# Patient Record
Sex: MALE | Race: WHITE | NOT HISPANIC OR LATINO | Employment: FULL TIME | URBAN - METROPOLITAN AREA
[De-identification: names, ages, dates, MRNs, and addresses within clinical notes are randomized per-mention and may not be internally consistent; named-entity substitution may affect disease eponyms.]

---

## 2018-08-08 ENCOUNTER — ANESTHESIA EVENT (OUTPATIENT)
Dept: PERIOP | Facility: HOSPITAL | Age: 23
End: 2018-08-08

## 2018-08-08 RX ORDER — FAMOTIDINE 10 MG/ML
20 INJECTION, SOLUTION INTRAVENOUS ONCE
Status: CANCELLED | OUTPATIENT
Start: 2018-08-08 | End: 2018-08-08

## 2018-08-09 ENCOUNTER — HOSPITAL ENCOUNTER (OUTPATIENT)
Facility: HOSPITAL | Age: 23
Setting detail: HOSPITAL OUTPATIENT SURGERY
Discharge: HOME | End: 2018-08-09
Attending: OTOLARYNGOLOGY | Admitting: ANESTHESIOLOGY

## 2018-08-09 ENCOUNTER — ANESTHESIA (OUTPATIENT)
Dept: PERIOP | Facility: HOSPITAL | Age: 23
End: 2018-08-09

## 2018-08-09 VITALS
TEMPERATURE: 98.2 F | DIASTOLIC BLOOD PRESSURE: 68 MMHG | HEART RATE: 61 BPM | BODY MASS INDEX: 41.75 KG/M2 | RESPIRATION RATE: 19 BRPM | OXYGEN SATURATION: 92 % | HEIGHT: 73 IN | SYSTOLIC BLOOD PRESSURE: 121 MMHG | WEIGHT: 315 LBS

## 2018-08-09 DIAGNOSIS — J03.90 TONSILLITIS: ICD-10-CM

## 2018-08-09 PROBLEM — J35.01 CHRONIC TONSILLITIS: Status: ACTIVE | Noted: 2018-08-09

## 2018-08-09 LAB
HCT VFR BLD AUTO: 43.6 % (ref 38.9–50.9)
HGB BLD-MCNC: 14.5 G/DL (ref 13.1–17.5)

## 2018-08-09 PROCEDURE — 85014 HEMATOCRIT: CPT | Performed by: ANESTHESIOLOGY

## 2018-08-09 PROCEDURE — 25010000002 PROPOFOL 10 MG/ML EMULSION: Performed by: ANESTHESIOLOGY

## 2018-08-09 PROCEDURE — 25010000002 MIDAZOLAM PER 1 MG: Performed by: ANESTHESIOLOGY

## 2018-08-09 PROCEDURE — 25010000002 FENTANYL CITRATE (PF) 100 MCG/2ML SOLUTION: Performed by: ANESTHESIOLOGY

## 2018-08-09 PROCEDURE — 25010000002 ONDANSETRON PER 1 MG: Performed by: ANESTHESIOLOGY

## 2018-08-09 PROCEDURE — 88304 TISSUE EXAM BY PATHOLOGIST: CPT | Performed by: OTOLARYNGOLOGY

## 2018-08-09 PROCEDURE — 85018 HEMOGLOBIN: CPT | Performed by: ANESTHESIOLOGY

## 2018-08-09 PROCEDURE — 25010000002 NEOSTIGMINE PER 0.5 MG: Performed by: ANESTHESIOLOGY

## 2018-08-09 PROCEDURE — 25010000002 SUCCINYLCHOLINE PER 20 MG: Performed by: ANESTHESIOLOGY

## 2018-08-09 PROCEDURE — 25010000002 HYDROMORPHONE PER 4 MG: Performed by: ANESTHESIOLOGY

## 2018-08-09 PROCEDURE — 25010000002 PROMETHAZINE PER 50 MG: Performed by: ANESTHESIOLOGY

## 2018-08-09 PROCEDURE — 25010000002 DEXAMETHASONE PER 1 MG: Performed by: ANESTHESIOLOGY

## 2018-08-09 RX ORDER — FENTANYL CITRATE 50 UG/ML
50 INJECTION, SOLUTION INTRAMUSCULAR; INTRAVENOUS
Status: DISCONTINUED | OUTPATIENT
Start: 2018-08-09 | End: 2018-08-09 | Stop reason: HOSPADM

## 2018-08-09 RX ORDER — FAMOTIDINE 20 MG/1
20 TABLET, FILM COATED ORAL ONCE
Status: COMPLETED | OUTPATIENT
Start: 2018-08-09 | End: 2018-08-09

## 2018-08-09 RX ORDER — PROMETHAZINE HYDROCHLORIDE 25 MG/1
25 TABLET ORAL ONCE AS NEEDED
Status: COMPLETED | OUTPATIENT
Start: 2018-08-09 | End: 2018-08-09

## 2018-08-09 RX ORDER — LIDOCAINE HYDROCHLORIDE 10 MG/ML
INJECTION, SOLUTION INFILTRATION; PERINEURAL AS NEEDED
Status: DISCONTINUED | OUTPATIENT
Start: 2018-08-09 | End: 2018-08-09 | Stop reason: SURG

## 2018-08-09 RX ORDER — OXYCODONE HYDROCHLORIDE AND ACETAMINOPHEN 5; 325 MG/1; MG/1
1 TABLET ORAL ONCE AS NEEDED
Status: DISCONTINUED | OUTPATIENT
Start: 2018-08-09 | End: 2018-08-09 | Stop reason: HOSPADM

## 2018-08-09 RX ORDER — OXYCODONE HYDROCHLORIDE AND ACETAMINOPHEN 5; 325 MG/1; MG/1
1-2 TABLET ORAL EVERY 4 HOURS PRN
Qty: 40 TABLET | Refills: 0 | Status: SHIPPED | OUTPATIENT
Start: 2018-08-09

## 2018-08-09 RX ORDER — MAGNESIUM HYDROXIDE 1200 MG/15ML
LIQUID ORAL AS NEEDED
Status: DISCONTINUED | OUTPATIENT
Start: 2018-08-09 | End: 2018-08-09 | Stop reason: HOSPADM

## 2018-08-09 RX ORDER — BUSPIRONE HYDROCHLORIDE 15 MG/1
30 TABLET ORAL 2 TIMES DAILY
COMMUNITY

## 2018-08-09 RX ORDER — PROMETHAZINE HYDROCHLORIDE 25 MG/ML
6.25 INJECTION, SOLUTION INTRAMUSCULAR; INTRAVENOUS ONCE AS NEEDED
Status: COMPLETED | OUTPATIENT
Start: 2018-08-09 | End: 2018-08-09

## 2018-08-09 RX ORDER — ATRACURIUM BESYLATE 10 MG/ML
INJECTION, SOLUTION INTRAVENOUS AS NEEDED
Status: DISCONTINUED | OUTPATIENT
Start: 2018-08-09 | End: 2018-08-09 | Stop reason: SURG

## 2018-08-09 RX ORDER — HYDROMORPHONE HYDROCHLORIDE 1 MG/ML
0.5 INJECTION, SOLUTION INTRAMUSCULAR; INTRAVENOUS; SUBCUTANEOUS
Status: DISCONTINUED | OUTPATIENT
Start: 2018-08-09 | End: 2018-08-09 | Stop reason: HOSPADM

## 2018-08-09 RX ORDER — PROPOFOL 10 MG/ML
VIAL (ML) INTRAVENOUS AS NEEDED
Status: DISCONTINUED | OUTPATIENT
Start: 2018-08-09 | End: 2018-08-09 | Stop reason: SURG

## 2018-08-09 RX ORDER — LIDOCAINE HYDROCHLORIDE 10 MG/ML
0.5 INJECTION, SOLUTION EPIDURAL; INFILTRATION; INTRACAUDAL; PERINEURAL ONCE AS NEEDED
Status: COMPLETED | OUTPATIENT
Start: 2018-08-09 | End: 2018-08-09

## 2018-08-09 RX ORDER — ONDANSETRON 2 MG/ML
4 INJECTION INTRAMUSCULAR; INTRAVENOUS ONCE AS NEEDED
Status: DISCONTINUED | OUTPATIENT
Start: 2018-08-09 | End: 2018-08-09 | Stop reason: HOSPADM

## 2018-08-09 RX ORDER — SODIUM CHLORIDE 0.9 % (FLUSH) 0.9 %
1-10 SYRINGE (ML) INJECTION AS NEEDED
Status: DISCONTINUED | OUTPATIENT
Start: 2018-08-09 | End: 2018-08-09 | Stop reason: HOSPADM

## 2018-08-09 RX ORDER — ONDANSETRON 2 MG/ML
INJECTION INTRAMUSCULAR; INTRAVENOUS AS NEEDED
Status: DISCONTINUED | OUTPATIENT
Start: 2018-08-09 | End: 2018-08-09 | Stop reason: SURG

## 2018-08-09 RX ORDER — SODIUM CHLORIDE, SODIUM LACTATE, POTASSIUM CHLORIDE, CALCIUM CHLORIDE 600; 310; 30; 20 MG/100ML; MG/100ML; MG/100ML; MG/100ML
9 INJECTION, SOLUTION INTRAVENOUS CONTINUOUS
Status: DISCONTINUED | OUTPATIENT
Start: 2018-08-09 | End: 2018-08-09 | Stop reason: HOSPADM

## 2018-08-09 RX ORDER — FENTANYL CITRATE 50 UG/ML
INJECTION, SOLUTION INTRAMUSCULAR; INTRAVENOUS AS NEEDED
Status: DISCONTINUED | OUTPATIENT
Start: 2018-08-09 | End: 2018-08-09 | Stop reason: SURG

## 2018-08-09 RX ORDER — ESCITALOPRAM OXALATE 10 MG/1
10 TABLET ORAL
COMMUNITY

## 2018-08-09 RX ORDER — MIDAZOLAM HYDROCHLORIDE 1 MG/ML
INJECTION INTRAMUSCULAR; INTRAVENOUS AS NEEDED
Status: DISCONTINUED | OUTPATIENT
Start: 2018-08-09 | End: 2018-08-09 | Stop reason: SURG

## 2018-08-09 RX ORDER — DEXAMETHASONE SODIUM PHOSPHATE 4 MG/ML
INJECTION, SOLUTION INTRA-ARTICULAR; INTRALESIONAL; INTRAMUSCULAR; INTRAVENOUS; SOFT TISSUE AS NEEDED
Status: DISCONTINUED | OUTPATIENT
Start: 2018-08-09 | End: 2018-08-09 | Stop reason: SURG

## 2018-08-09 RX ORDER — GLYCOPYRROLATE 0.2 MG/ML
INJECTION INTRAMUSCULAR; INTRAVENOUS AS NEEDED
Status: DISCONTINUED | OUTPATIENT
Start: 2018-08-09 | End: 2018-08-09 | Stop reason: SURG

## 2018-08-09 RX ORDER — PROMETHAZINE HYDROCHLORIDE 25 MG/1
25 SUPPOSITORY RECTAL ONCE AS NEEDED
Status: COMPLETED | OUTPATIENT
Start: 2018-08-09 | End: 2018-08-09

## 2018-08-09 RX ORDER — SUCCINYLCHOLINE CHLORIDE 20 MG/ML
INJECTION INTRAMUSCULAR; INTRAVENOUS AS NEEDED
Status: DISCONTINUED | OUTPATIENT
Start: 2018-08-09 | End: 2018-08-09 | Stop reason: SURG

## 2018-08-09 RX ORDER — CHOLECALCIFEROL (VITAMIN D3) 125 MCG
5 CAPSULE ORAL NIGHTLY
COMMUNITY

## 2018-08-09 RX ADMIN — GLYCOPYRROLATE 0.4 MG: 0.2 INJECTION, SOLUTION INTRAMUSCULAR; INTRAVENOUS at 13:53

## 2018-08-09 RX ADMIN — SODIUM CHLORIDE, POTASSIUM CHLORIDE, SODIUM LACTATE AND CALCIUM CHLORIDE 9 ML/HR: 600; 310; 30; 20 INJECTION, SOLUTION INTRAVENOUS at 13:13

## 2018-08-09 RX ADMIN — PROMETHAZINE HYDROCHLORIDE 6.25 MG: 25 INJECTION INTRAMUSCULAR; INTRAVENOUS at 14:23

## 2018-08-09 RX ADMIN — LIDOCAINE HYDROCHLORIDE 0.3 ML: 10 INJECTION, SOLUTION EPIDURAL; INFILTRATION; INTRACAUDAL; PERINEURAL at 13:14

## 2018-08-09 RX ADMIN — FENTANYL CITRATE 50 MCG: 50 INJECTION INTRAMUSCULAR; INTRAVENOUS at 14:25

## 2018-08-09 RX ADMIN — LIDOCAINE HYDROCHLORIDE 100 MG: 10 INJECTION, SOLUTION INFILTRATION; PERINEURAL at 13:28

## 2018-08-09 RX ADMIN — SODIUM CHLORIDE, POTASSIUM CHLORIDE, SODIUM LACTATE AND CALCIUM CHLORIDE: 600; 310; 30; 20 INJECTION, SOLUTION INTRAVENOUS at 13:15

## 2018-08-09 RX ADMIN — HYDROMORPHONE HYDROCHLORIDE 0.5 MG: 1 INJECTION, SOLUTION INTRAMUSCULAR; INTRAVENOUS; SUBCUTANEOUS at 14:45

## 2018-08-09 RX ADMIN — ATRACURIUM BESYLATE 10 MG: 10 INJECTION, SOLUTION INTRAVENOUS at 13:28

## 2018-08-09 RX ADMIN — FENTANYL CITRATE 50 MCG: 50 INJECTION, SOLUTION INTRAMUSCULAR; INTRAVENOUS at 13:20

## 2018-08-09 RX ADMIN — PROPOFOL 350 MG: 10 INJECTION, EMULSION INTRAVENOUS at 13:28

## 2018-08-09 RX ADMIN — DEXAMETHASONE SODIUM PHOSPHATE 8 MG: 4 INJECTION, SOLUTION INTRAMUSCULAR; INTRAVENOUS at 13:35

## 2018-08-09 RX ADMIN — FENTANYL CITRATE 50 MCG: 50 INJECTION INTRAMUSCULAR; INTRAVENOUS at 14:20

## 2018-08-09 RX ADMIN — FAMOTIDINE 20 MG: 20 TABLET ORAL at 13:13

## 2018-08-09 RX ADMIN — Medication 3 MG: at 13:53

## 2018-08-09 RX ADMIN — ATRACURIUM BESYLATE 20 MG: 10 INJECTION, SOLUTION INTRAVENOUS at 13:39

## 2018-08-09 RX ADMIN — ONDANSETRON 4 MG: 2 INJECTION INTRAMUSCULAR; INTRAVENOUS at 13:53

## 2018-08-09 RX ADMIN — OXYCODONE HYDROCHLORIDE AND ACETAMINOPHEN 1 TABLET: 5; 325 TABLET ORAL at 15:33

## 2018-08-09 RX ADMIN — MIDAZOLAM HYDROCHLORIDE 2 MG: 1 INJECTION, SOLUTION INTRAMUSCULAR; INTRAVENOUS at 13:20

## 2018-08-09 RX ADMIN — SUCCINYLCHOLINE CHLORIDE 163.8 MG: 20 INJECTION, SOLUTION INTRAMUSCULAR; INTRAVENOUS at 13:28

## 2018-08-09 NOTE — OP NOTE
TONSILLECTOMY  Procedure Report    Patient Name:  Jordan Castillo  YOB: 1995    Date of Surgery:  8/9/2018     Indications:  23 year old white male with large tonsils and chronic inflammation including tonsil stones. He elected to proceed with tonsillectomy to improve his symptoms.    Pre-op Diagnosis:   tonsil hypertrophy       Post-Op Diagnosis Codes:     * Chronic tonsillitis [J35.01]     * Tonsillar hypertrophy [J35.1]    Procedure/CPT® Codes:      Procedure(s):  TONSILLECTOMY    Staff:  Surgeon(s):  Dallin Castellanos MD         Anesthesia: General    Estimated Blood Loss: 30ml    Implants:    Nothing was implanted during the procedure    Specimen:          A: bilateral tonsils      Findings: 3+ tonsils    Complications: none immediate    Description of Procedure: Patient was taken to the OR and placed on the table in supine position. Shoulder roll was placed and table was rotated 90 degrees. After endotracheal intubation the johnathan steffany retractor was placed on suspension after draping appropriately. Palpation of the palate revealed no cleft. Each tonsil was dissected from each fossa with electrocautery. Tolerated the procedure well. Throat pack that was placed previously was removed. Awaken from anesthesia and taken to the PACU in stable condition. Will d/c home on percocet.      Dallin Castellanos MD     Date: 8/9/2018  Time: 1:26 PM

## 2018-08-09 NOTE — H&P
"[facility] Pre-op    Full history and physical note from office is up to date.  See office note on chart 7/18/18.    /86 (BP Location: Right arm, Patient Position: Lying)   Pulse 84   Temp 98.3 °F (36.8 °C) (Temporal Artery )   Resp 20   Ht 185.4 cm (73\")   Wt (!) 164 kg (361 lb)   SpO2 96%   BMI 47.63 kg/m²     Cancer Staging (if applicable)  Cancer Patient: __ yes _x_no __unknown__N/A; If yes, clinical stage T:__ N:__M:__, stage group or __N/A    Kalani Singh, APRN 8/9/2018 1:10 PM    "

## 2018-08-09 NOTE — BRIEF OP NOTE
TONSILLECTOMY  Progress Note    Jordan Castillo  8/9/2018    Pre-op Diagnosis:   tonsil hypertrophy       Post-Op Diagnosis Codes:     * Chronic tonsillitis [J35.01]     * Tonsillar hypertrophy [J35.1]    Procedure/CPT® Codes:      Procedure(s):  TONSILLECTOMY    Surgeon(s):  Dallin Castellanos MD    Anesthesia: General    Staff:   * No surgical staff found *    Estimated Blood Loss: 30ml    Urine Voided: * No values recorded between 8/9/2018  1:23 PM and 8/9/2018  1:25 PM *    Specimens:                A: bilateral tonsils      Drains:      Findings: 3+ tonsils    Complications: none immediate      Dallin Castellanos MD     Date: 8/9/2018  Time: 1:25 PM

## 2018-08-09 NOTE — ANESTHESIA PREPROCEDURE EVALUATION
Anesthesia Evaluation     NPO Solid Status: > 8 hours  NPO Liquid Status: > 8 hours           Airway   Mallampati: II  TM distance: >3 FB  Possible difficult intubation  Dental - normal exam     Pulmonary    (+) decreased breath sounds,   (-) asthma, not a smoker  Cardiovascular     Rhythm: regular  Rate: normal    (-) hypertension, angina, murmur      Neuro/Psych  (-) seizures, TIA, CVA  GI/Hepatic/Renal/Endo    (-) liver disease, no renal disease, diabetes    Musculoskeletal     Abdominal    Substance History      OB/GYN          Other            Phys Exam Other: Obesity                Anesthesia Plan    ASA 3     general   (GA  Glidescope in the OR)  intravenous induction     Plan discussed with CRNA.

## 2018-08-09 NOTE — ANESTHESIA POSTPROCEDURE EVALUATION
Patient: Jordan Castillo    Procedure Summary     Date:  08/09/18 Room / Location:   [facility] / [facility]    Anesthesia Start:  1323 Anesthesia Stop:      Procedure:  TONSILLECTOMY (N/A Throat) Diagnosis:       Chronic tonsillitis      Tonsillar hypertrophy      (tonsil hypertrophy)    Surgeon:  Dallin Castellanos MD Provider:  Roopa Hogue MD    Anesthesia Type:  general ASA Status:  3          Anesthesia Type: general  Last vitals  BP   143/86 (08/09/18 1304)   Temp   98.3 °F (36.8 °C) (08/09/18 1304)   Pulse   84 (08/09/18 1304)   Resp   20 (08/09/18 1304)     SpO2   96 % (08/09/18 1304)     Post Anesthesia Care and Evaluation    Patient location during evaluation: PACU  Patient participation: complete - patient participated  Level of consciousness: sleepy but conscious  Pain score: 0  Pain management: adequate  Airway patency: patent  Anesthetic complications: No anesthetic complications  PONV Status: none  Cardiovascular status: hemodynamically stable and acceptable  Respiratory status: nonlabored ventilation, acceptable and nasal cannula  Hydration status: acceptable    Comments: Sitting straight up 134/77 66 20 97.1F 96%

## 2018-08-10 LAB
CYTO UR: NORMAL
LAB AP CASE REPORT: NORMAL
LAB AP CLINICAL INFORMATION: NORMAL
PATH REPORT.FINAL DX SPEC: NORMAL
PATH REPORT.GROSS SPEC: NORMAL

## 2022-11-24 NOTE — ANESTHESIA PROCEDURE NOTES
Airway  Urgency: elective    Date/Time: 8/9/2018 1:28 PM  End Time:8/9/2018 1:30 PM  Airway not difficult    General Information and Staff    Patient location during procedure: OR  Anesthesiologist: KARRIE CARTAGENA    Indications and Patient Condition  Indications for airway management: airway protection    Preoxygenated: yes  MILS not maintained throughout  Mask difficulty assessment: 1 - vent by mask    Final Airway Details  Final airway type: endotracheal airway      Successful airway: ETT  Cuffed: yes   Successful intubation technique: direct laryngoscopy  Facilitating devices/methods: cricoid pressure  Endotracheal tube insertion site: oral  Blade: Dillon  Blade size: #4  ETT size: 7.5 mm  Cormack-Lehane Classification: grade I - full view of glottis  Placement verified by: chest auscultation and capnometry   Inital cuff pressure (cm H2O): 29  Cuff volume (mL): 8  Measured from: lips  ETT to lips (cm): 22  Number of attempts at approach: 1    Additional Comments  Negative epigastric sounds, Breath sound equal bilaterally with symmetric chest rise and fall             Hemorrhage of rectum and anus

## 2024-02-02 ENCOUNTER — OFFICE VISIT (OUTPATIENT)
Dept: FAMILY MEDICINE CLINIC | Facility: CLINIC | Age: 29
End: 2024-02-02
Payer: COMMERCIAL

## 2024-02-02 ENCOUNTER — TELEPHONE (OUTPATIENT)
Dept: CARDIOLOGY | Facility: CLINIC | Age: 29
End: 2024-02-02
Payer: COMMERCIAL

## 2024-02-02 VITALS
OXYGEN SATURATION: 95 % | SYSTOLIC BLOOD PRESSURE: 130 MMHG | DIASTOLIC BLOOD PRESSURE: 90 MMHG | BODY MASS INDEX: 45.1 KG/M2 | HEIGHT: 70 IN | HEART RATE: 89 BPM | WEIGHT: 315 LBS

## 2024-02-02 DIAGNOSIS — I10 PRIMARY HYPERTENSION: ICD-10-CM

## 2024-02-02 DIAGNOSIS — G47.33 OBSTRUCTIVE SLEEP APNEA SYNDROME: ICD-10-CM

## 2024-02-02 DIAGNOSIS — Z13.29 SCREENING FOR THYROID DISORDER: ICD-10-CM

## 2024-02-02 DIAGNOSIS — Z13.220 LIPID SCREENING: ICD-10-CM

## 2024-02-02 DIAGNOSIS — Z11.59 NEED FOR HEPATITIS C SCREENING TEST: ICD-10-CM

## 2024-02-02 DIAGNOSIS — R07.9 CHEST PAIN, UNSPECIFIED TYPE: Primary | ICD-10-CM

## 2024-02-02 DIAGNOSIS — Z13.1 SCREENING FOR DIABETES MELLITUS: ICD-10-CM

## 2024-02-02 PROCEDURE — 36415 COLL VENOUS BLD VENIPUNCTURE: CPT | Performed by: PHYSICIAN ASSISTANT

## 2024-02-02 PROCEDURE — 99204 OFFICE O/P NEW MOD 45 MIN: CPT | Performed by: PHYSICIAN ASSISTANT

## 2024-02-02 NOTE — PROGRESS NOTES
New Patient Office Visit      Date: 2024   Patient Name: Jordan Castillo  : 1995   MRN: [id number]     Chief Complaint:    Chief Complaint   Patient presents with    Establish Care    Chest Pain       History of Present Illness: Jordan Castillo is a 29 y.o. male who is here today to establish care.  He states that he has had some CP off and on in recent months. He had it once a few weeks ago, and his blood pressure was 172/98. He has not had any pain with exertion. He felt SOA, but he was not tight in his chest. He has reduced his caffeine and nicotine intake. He was drinking a half pot of coffee in the morning, and a soda or Red Bull in the afternoon. He has also reduced the frequency of his vaping from chain vaping to heavy vaping.     Subjective      Review of Systems:   Review of Systems   Respiratory:  Negative for shortness of breath.    Cardiovascular:  Positive for chest pain and palpitations. Negative for leg swelling.       Past Medical History:   Past Medical History:   Diagnosis Date    Anxiety and depression     Obesity     Seasonal allergies     Tonsillitis, chronic        Past Surgical History:   Past Surgical History:   Procedure Laterality Date    TONSILLECTOMY N/A 2018    Procedure: TONSILLECTOMY;  Surgeon: Dallin Castellanos MD;  Location: [facility];  Service: ENT    WISDOM TOOTH EXTRACTION         Family History:   Family History   Problem Relation Age of Onset    Anxiety disorder Mother     Mental illness Brother     Diabetes Maternal Grandmother     Coronary artery disease Maternal Grandmother 40       Social History:   Social History     Socioeconomic History    Marital status: Single   Tobacco Use    Smoking status: Some Days     Passive exposure: Never    Smokeless tobacco: Never    Tobacco comments:     Vapor   Substance and Sexual Activity    Alcohol use: Not Currently     Comment: Wiliam    Drug use: Never    Sexual activity: Yes     Partners: Male     Birth  "control/protection: Same-sex partner       Medications:     Current Outpatient Medications:     busPIRone (BUSPAR) 15 MG tablet, Take 30 mg by mouth 2 (Two) Times a Day., Disp: , Rfl:     metoprolol tartrate (LOPRESSOR) 25 MG tablet, Take 0.5 tablets by mouth 2 (Two) Times a Day., Disp: 30 tablet, Rfl: 1    Allergies:   Allergies   Allergen Reactions    Penicillins Rash and Unknown (See Comments)     Penicillins       Objective     Physical Exam:  Vital Signs:   Vitals:    02/02/24 0809   BP: 130/90   BP Location: Right arm   Patient Position: Sitting   Cuff Size: Large Adult   Pulse: 89   SpO2: 95%   Weight: (!) 222 kg (488 lb 11.2 oz)   Height: 177.8 cm (70\")     Body mass index is 70.12 kg/m².    Physical Exam  Vitals and nursing note reviewed.   Constitutional:       General: He is not in acute distress.     Appearance: Normal appearance. He is not ill-appearing.   HENT:      Head: Normocephalic and atraumatic.   Cardiovascular:      Rate and Rhythm: Normal rate and regular rhythm.      Pulses: Normal pulses.      Heart sounds: Normal heart sounds.   Pulmonary:      Effort: Pulmonary effort is normal. No respiratory distress.      Breath sounds: Normal breath sounds.   Musculoskeletal:      Right lower leg: No edema.      Left lower leg: No edema.   Skin:     General: Skin is warm and dry.   Neurological:      General: No focal deficit present.      Mental Status: He is alert and oriented to person, place, and time.      Motor: No weakness.      Coordination: Coordination normal.   Psychiatric:         Mood and Affect: Mood normal.         Behavior: Behavior normal.         ECG 12 Lead    Date/Time: 2/19/2024 9:10 PM  Performed by: Flory Garcia PA-C    Authorized by: Flory Garcia PA-C  Previous ECG: no previous ECG available  Rhythm: sinus rhythm    Clinical impression: normal ECG      Results:   PHQ-9 Total Score: 0        Assessment / Plan      Assessment/Plan:   Diagnoses " and all orders for this visit:    1. Chest pain, unspecified type (Primary)  Assessment & Plan:  His chest pain has been recurrent, but it is not active currently.  His EKG was normal, but I recommend cardiac evaluation due to risk factors.  He is in agreement, so we will schedule consult.    Orders:  -     ECG 12 Lead  -     Ambulatory Referral to Cardiology    2. Obstructive sleep apnea syndrome  Assessment & Plan:  He has a history of sleep apnea, which is not currently controlled.  He states that it was diagnosed, but we could not get insurance approval on a CPAP.  He continues to have symptoms, so we will schedule him with sleep medicine for further evaluation.  He is in agreement with this plan.    Orders:  -     Ambulatory Referral to Sleep Medicine    3. Primary hypertension  Assessment & Plan:  Hypertension is chronic currently controlled.  I advised decreasing his caffeine and sodium intake as well as increasing his exercise.  He would like to go ahead and try some medication, so we will try a low-dose of metoprolol, which may have a positive impact on his anxiety symptoms as well.  I recommend that he monitor his levels, keep a log of his blood pressure and heart rate, and bring it to a follow-up appointment in 3 to 4 weeks.  He is in agreement with this plan.    Orders:  -     Comprehensive Metabolic Panel; Future  -     CBC Auto Differential; Future  -     metoprolol tartrate (LOPRESSOR) 25 MG tablet; Take 0.5 tablets by mouth 2 (Two) Times a Day.  Dispense: 30 tablet; Refill: 1  -     Ambulatory Referral to Cardiology  -     Comprehensive Metabolic Panel  -     CBC Auto Differential    4. Need for hepatitis C screening test  -     HCV Antibody Rfx To Qnt PCR; Future  -     HCV Antibody Rfx To Qnt PCR  -     Interpretation:    5. Screening for thyroid disorder  -     Thyroid Cascade Profile; Future  -     Thyroid Cascade Profile    6. Lipid screening  -     Lipid Panel; Future  -     Lipid Panel    7.  Screening for diabetes mellitus  -     Hemoglobin A1c; Future  -     Hemoglobin A1c         Follow Up:   Return in about 4 weeks (around 3/1/2024) for recheck.    Flory Garcia PA-C  [facility] Internal Medicine [facility]

## 2024-02-03 LAB
ALBUMIN SERPL-MCNC: 4.2 G/DL (ref 4.3–5.2)
ALBUMIN/GLOB SERPL: 1.5 {RATIO} (ref 1.2–2.2)
ALP SERPL-CCNC: 69 IU/L (ref 44–121)
ALT SERPL-CCNC: 65 IU/L (ref 0–44)
AST SERPL-CCNC: 35 IU/L (ref 0–40)
BASOPHILS # BLD AUTO: 0 X10E3/UL (ref 0–0.2)
BASOPHILS NFR BLD AUTO: 1 %
BILIRUB SERPL-MCNC: 0.6 MG/DL (ref 0–1.2)
BUN SERPL-MCNC: 9 MG/DL (ref 6–20)
BUN/CREAT SERPL: 12 (ref 9–20)
CALCIUM SERPL-MCNC: 9.1 MG/DL (ref 8.7–10.2)
CHLORIDE SERPL-SCNC: 102 MMOL/L (ref 96–106)
CHOLEST SERPL-MCNC: 192 MG/DL (ref 100–199)
CO2 SERPL-SCNC: 24 MMOL/L (ref 20–29)
CREAT SERPL-MCNC: 0.76 MG/DL (ref 0.76–1.27)
EGFRCR SERPLBLD CKD-EPI 2021: 126 ML/MIN/1.73
EOSINOPHIL # BLD AUTO: 0.2 X10E3/UL (ref 0–0.4)
EOSINOPHIL NFR BLD AUTO: 3 %
ERYTHROCYTE [DISTWIDTH] IN BLOOD BY AUTOMATED COUNT: 12.5 % (ref 11.6–15.4)
GLOBULIN SER CALC-MCNC: 2.8 G/DL (ref 1.5–4.5)
GLUCOSE SERPL-MCNC: 90 MG/DL (ref 70–99)
HBA1C MFR BLD: 5.4 % (ref 4.8–5.6)
HCT VFR BLD AUTO: 43.4 % (ref 37.5–51)
HCV AB SERPL QL IA: NORMAL
HCV IGG SERPL QL IA: NON REACTIVE
HDLC SERPL-MCNC: 38 MG/DL
HGB BLD-MCNC: 14.6 G/DL (ref 13–17.7)
IMM GRANULOCYTES # BLD AUTO: 0 X10E3/UL (ref 0–0.1)
IMM GRANULOCYTES NFR BLD AUTO: 0 %
LDLC SERPL CALC-MCNC: 132 MG/DL (ref 0–99)
LYMPHOCYTES # BLD AUTO: 2.7 X10E3/UL (ref 0.7–3.1)
LYMPHOCYTES NFR BLD AUTO: 35 %
MCH RBC QN AUTO: 29.3 PG (ref 26.6–33)
MCHC RBC AUTO-ENTMCNC: 33.6 G/DL (ref 31.5–35.7)
MCV RBC AUTO: 87 FL (ref 79–97)
MONOCYTES # BLD AUTO: 0.5 X10E3/UL (ref 0.1–0.9)
MONOCYTES NFR BLD AUTO: 6 %
NEUTROPHILS # BLD AUTO: 4.5 X10E3/UL (ref 1.4–7)
NEUTROPHILS NFR BLD AUTO: 55 %
PLATELET # BLD AUTO: 240 X10E3/UL (ref 150–450)
POTASSIUM SERPL-SCNC: 4.3 MMOL/L (ref 3.5–5.2)
PROT SERPL-MCNC: 7 G/DL (ref 6–8.5)
RBC # BLD AUTO: 4.99 X10E6/UL (ref 4.14–5.8)
SODIUM SERPL-SCNC: 139 MMOL/L (ref 134–144)
TRIGL SERPL-MCNC: 121 MG/DL (ref 0–149)
TSH SERPL DL<=0.005 MIU/L-ACNC: 2.95 UIU/ML (ref 0.45–4.5)
VLDLC SERPL CALC-MCNC: 22 MG/DL (ref 5–40)
WBC # BLD AUTO: 7.9 X10E3/UL (ref 3.4–10.8)

## 2024-02-06 ENCOUNTER — TELEPHONE (OUTPATIENT)
Dept: CARDIOLOGY | Facility: CLINIC | Age: 29
End: 2024-02-06
Payer: COMMERCIAL

## 2024-02-14 ENCOUNTER — TELEPHONE (OUTPATIENT)
Dept: CARDIOLOGY | Facility: CLINIC | Age: 29
End: 2024-02-14
Payer: COMMERCIAL

## 2024-02-19 PROBLEM — G47.33 OBSTRUCTIVE SLEEP APNEA SYNDROME: Status: ACTIVE | Noted: 2024-02-19

## 2024-02-19 PROBLEM — I10 PRIMARY HYPERTENSION: Status: ACTIVE | Noted: 2024-02-19

## 2024-02-19 PROBLEM — R07.9 CHEST PAIN: Status: ACTIVE | Noted: 2024-02-19

## 2024-02-19 PROCEDURE — 93000 ELECTROCARDIOGRAM COMPLETE: CPT | Performed by: PHYSICIAN ASSISTANT

## 2024-02-20 NOTE — ASSESSMENT & PLAN NOTE
His chest pain has been recurrent, but it is not active currently.  His EKG was normal, but I recommend cardiac evaluation due to risk factors.  He is in agreement, so we will schedule consult.

## 2024-02-20 NOTE — ASSESSMENT & PLAN NOTE
Hypertension is chronic currently controlled.  I advised decreasing his caffeine and sodium intake as well as increasing his exercise.  He would like to go ahead and try some medication, so we will try a low-dose of metoprolol, which may have a positive impact on his anxiety symptoms as well.  I recommend that he monitor his levels, keep a log of his blood pressure and heart rate, and bring it to a follow-up appointment in 3 to 4 weeks.  He is in agreement with this plan.

## 2024-02-20 NOTE — ASSESSMENT & PLAN NOTE
He has a history of sleep apnea, which is not currently controlled.  He states that it was diagnosed, but we could not get insurance approval on a CPAP.  He continues to have symptoms, so we will schedule him with sleep medicine for further evaluation.  He is in agreement with this plan.

## 2024-03-04 ENCOUNTER — OFFICE VISIT (OUTPATIENT)
Dept: CARDIOLOGY | Facility: CLINIC | Age: 29
End: 2024-03-04
Payer: COMMERCIAL

## 2024-03-04 VITALS
WEIGHT: 315 LBS | BODY MASS INDEX: 45.1 KG/M2 | DIASTOLIC BLOOD PRESSURE: 74 MMHG | RESPIRATION RATE: 18 BRPM | OXYGEN SATURATION: 98 % | HEART RATE: 88 BPM | TEMPERATURE: 97 F | SYSTOLIC BLOOD PRESSURE: 114 MMHG | HEIGHT: 70 IN

## 2024-03-04 DIAGNOSIS — I10 PRIMARY HYPERTENSION: Primary | ICD-10-CM

## 2024-03-04 DIAGNOSIS — R07.2 PRECORDIAL PAIN: ICD-10-CM

## 2024-03-04 DIAGNOSIS — G47.33 OBSTRUCTIVE SLEEP APNEA SYNDROME: ICD-10-CM

## 2024-03-04 PROCEDURE — 99203 OFFICE O/P NEW LOW 30 MIN: CPT | Performed by: INTERNAL MEDICINE

## 2024-03-04 PROCEDURE — 93000 ELECTROCARDIOGRAM COMPLETE: CPT | Performed by: INTERNAL MEDICINE

## 2024-03-04 RX ORDER — BUPROPION HYDROCHLORIDE 150 MG/1
TABLET ORAL
COMMUNITY
End: 2024-03-11

## 2024-03-04 NOTE — PROGRESS NOTES
[facility]  [facility] CARDIOLOGY  [address] [place] [address]  Dept: [phone]  Dept Fax: [phone]    Jordan Castillo  1995    New Patient Office Note    History of Present Illness:  Jordan Castillo is a 29 y.o. male who presents to the clinic for Establish Care for Chest pain,last month was at work and notices home sting chest discomfort on and off, that lasted for hours, he went to see PCP . His BP has been in the high side for few weeks,  and with PCP was 130.80, he was placed on low dose Metoprolol 12,5 bid, he is 39, vapes, no family history for CAD, no diabetes, since then has not had any more complaints, his BP here is 105/60, his cardiac exam is normal EKG is also normal, , I think his Chest discomfort might not be cardiac,  at this time, will just observe,  he was advised to be active, walking, exercise and eating less,     The following portions of the patient's history were reviewed and updated as appropriate: allergies, current medications, past family history, past medical history, past social history, past surgical history, and problem list.    Medications:  buPROPion XL  busPIRone  metoprolol tartrate    Subjective  Allergies   Allergen Reactions   • Penicillins Rash and Unknown (See Comments)     Penicillins        Past Medical History:   Diagnosis Date   • Anxiety and depression    • Obesity    • Seasonal allergies    • Tonsillitis, chronic        Past Surgical History:   Procedure Laterality Date   • TONSILLECTOMY N/A 8/9/2018    Procedure: TONSILLECTOMY;  Surgeon: Dallin Castellanos MD;  Location: [facility];  Service: ENT   • WISDOM TOOTH EXTRACTION         Family History   Problem Relation Age of Onset   • Anxiety disorder Mother    • Mental illness Brother    • Diabetes Maternal Grandmother    • Coronary artery disease Maternal Grandmother 40        Social History     Socioeconomic History   • Marital status: Single   Tobacco Use   • Smoking  "status: Never     Passive exposure: Never   • Smokeless tobacco: Never   • Tobacco comments:     Vapor   Vaping Use   • Vaping status: Every Day   • Substances: Nicotine   • Devices: Disposable   Substance and Sexual Activity   • Alcohol use: Not Currently     Comment: Wiliam   • Drug use: Never   • Sexual activity: Yes     Partners: Male     Birth control/protection: Same-sex partner       Review of Systems   Constitutional: Negative.    HENT: Negative.     Respiratory: Negative.     Cardiovascular: Negative.    Endocrine: Negative.    Genitourinary: Negative.    Musculoskeletal: Negative.    Skin: Negative.    Allergic/Immunologic: Negative.    Neurological: Negative.    Hematological: Negative.    Psychiatric/Behavioral: Negative.       Cardiovascular Procedures    ECHO/MUGA:  STRESS TESTS:   CARDIAC CATH:   DEVICES:   HOLTER:   CT/MRI:   VASCULAR:   CARDIOTHORACIC:     Objective  Vitals:    03/04/24 0922   BP: 138/74   BP Location: Right arm   Patient Position: Lying   Cuff Size: Adult   Pulse: 88   Resp: 18   Temp: 97 °F (36.1 °C)   TempSrc: Infrared   SpO2: 98%   Weight: (!) 221 kg (487 lb)   Height: 177.8 cm (70\")   PainSc: 0-No pain       Physical Exam  Vitals reviewed.   Constitutional:       Appearance: Healthy appearance. Not in distress.   Eyes:      Pupils: Pupils are equal, round, and reactive to light.   HENT:    Mouth/Throat:      Pharynx: Oropharynx is clear.   Neck:      Thyroid: Thyroid normal.      Vascular: No JVR. JVD normal.   Pulmonary:      Effort: Pulmonary effort is normal.      Breath sounds: Normal breath sounds. No wheezing. No rhonchi. No rales.   Chest:      Chest wall: Not tender to palpatation.   Cardiovascular:      PMI at left midclavicular line. Normal rate. Regular rhythm. Normal S1. Normal S2.       Murmurs: There is no murmur.      No gallop.  No click. No rub.   Pulses:     Intact distal pulses.      Carotid: 3+ bilaterally.     Radial: 3+ bilaterally.     Femoral: 3+ " bilaterally.     Dorsalis pedis: 3+ bilaterally.     Posterior tibial: 3+ bilaterally.  Edema:     Peripheral edema absent.   Abdominal:      General: Bowel sounds are normal.      Palpations: Abdomen is soft.      Tenderness: There is no abdominal tenderness.   Musculoskeletal: Normal range of motion.         General: No tenderness.      Cervical back: Normal range of motion and neck supple. Skin:     General: Skin is warm and dry.   Neurological:      General: No focal deficit present.      Mental Status: Alert and oriented to person, place and time.        Diagnostic Data    ECG 12 Lead    Date/Time: 3/4/2024 9:56 AM  Performed by: Dru Angel MD    Authorized by: Dru Angel MD  Comparison: compared with previous ECG from 2/2/2024  Similar to previous ECG  Rhythm: sinus rhythm  Rate: normal  BPM: 77  QRS axis: normal    Clinical impression: normal ECG        Assessment and Plan  Diagnoses and all orders for this visit:    Primary hypertension-BP is 105.60, only on low dose Metoprolol , will keep it for now    Obstructive sleep apnea syndrome- waiting to see sleep doctor    Precordial pain- atypical just one day last month, will watch     Other orders  -     buPROPion XL (Wellbutrin XL) 150 MG 24 hr tablet        Return if symptoms worsen or fail to improve, for Recheck with Dr. Angel.    Dru Angel MD  03/04/2024

## 2024-03-11 ENCOUNTER — OFFICE VISIT (OUTPATIENT)
Dept: FAMILY MEDICINE CLINIC | Facility: CLINIC | Age: 29
End: 2024-03-11
Payer: COMMERCIAL

## 2024-03-11 VITALS
WEIGHT: 315 LBS | DIASTOLIC BLOOD PRESSURE: 90 MMHG | SYSTOLIC BLOOD PRESSURE: 140 MMHG | HEART RATE: 92 BPM | HEIGHT: 70 IN | OXYGEN SATURATION: 95 % | BODY MASS INDEX: 45.1 KG/M2

## 2024-03-11 DIAGNOSIS — I10 PRIMARY HYPERTENSION: ICD-10-CM

## 2024-03-11 RX ORDER — BUPROPION HYDROCHLORIDE 150 MG/1
150 TABLET ORAL DAILY
COMMUNITY

## 2024-03-11 NOTE — PROGRESS NOTES
Office Note     Name: Jordan Castillo    : 1995     MRN: [id number]     Chief Complaint  Hypertension    Subjective     History of Present Illness:  Jordan Castillo is a 29 y.o. male who presents today for eval of hypertension, which is chronic.  He states that he has been taking his metoprolol as directed, and his blood pressure has been averaging 135/80.  He states that he is not sure what his heart rate has been because he does not think his machine gives him a heart rate.  He states that he did feel tired and draggy the first few days on the medication, but it quickly resolved.  He states that he has not had any side effects since then.    He did see the cardiologist, and he did not think there was anything significant.    His referral states that they have not been able to get in touch with him from the sleep lab, but he states that he does have an appointment on 2024.  He states that he does plan to keep this appointment.    He states that he has not had any more chest pain since the 1 episode he had prior to his last visit.      Past Medical History:   Past Medical History:   Diagnosis Date    Anxiety and depression     Hypertension 24    Obesity     Seasonal allergies     Tonsillitis, chronic        Past Surgical History:   Past Surgical History:   Procedure Laterality Date    TONSILLECTOMY N/A 2018    Procedure: TONSILLECTOMY;  Surgeon: Dallin Castellanos MD;  Location: [facility];  Service: ENT    WISDOM TOOTH EXTRACTION         Family History:   Family History   Problem Relation Age of Onset    Anxiety disorder Mother     Mental illness Brother     Diabetes Maternal Grandmother     Coronary artery disease Maternal Grandmother 40    Heart disease Maternal Grandmother        Social History:   Social History     Socioeconomic History    Marital status: Single   Tobacco Use    Smoking status: Every Day     Current packs/day: 1.00     Average packs/day: 1 pack/day for 3.0 years (3.0 ttl  "pk-yrs)     Types: Cigarettes     Passive exposure: Never    Smokeless tobacco: Never    Tobacco comments:     Vapor   Vaping Use    Vaping status: Every Day    Substances: Nicotine    Devices: Disposable   Substance and Sexual Activity    Alcohol use: Not Currently     Comment: Wiliam    Drug use: Never    Sexual activity: Yes     Partners: Male     Birth control/protection: Same-sex partner       Immunizations:   Immunization History   Administered Date(s) Administered    31-influenza Vac Quardvalent Preservativ 10/01/2018    COVID-19 (MODERNA) 1st,2nd,3rd Dose Monovalent 01/13/2021, 02/10/2021    COVID-19 (MODERNA) Monovalent Original Booster 01/11/2022    DTaP, Unspecified 07/11/2001    Fluzone (or Fluarix & Flulaval for VFC) >6mos 11/25/2018    HEP A LIVE ATTENUATED 10/01/2018    Hepatitis A 11/25/2018    IPV 07/11/2001    Influenza Injectable Mdck Pf Quad 01/03/2020    MMR 07/11/2001    Tdap 09/19/2007        Medications:     Current Outpatient Medications:     busPIRone (BUSPAR) 15 MG tablet, Take 2 tablets by mouth 2 (Two) Times a Day., Disp: , Rfl:     metoprolol tartrate (LOPRESSOR) 25 MG tablet, Take 0.5 tablets by mouth 2 (Two) Times a Day., Disp: 90 tablet, Rfl: 0    buPROPion XL (WELLBUTRIN XL) 150 MG 24 hr tablet, Take 1 tablet by mouth Daily. Take 2 tabs PO QAM and 1 tab QPM, Disp: , Rfl:     Allergies:   Allergies   Allergen Reactions    Penicillins Rash and Unknown (See Comments)     Penicillins       Objective     Vital Signs  /90 (BP Location: Right arm, Patient Position: Sitting, Cuff Size: Large Adult)   Pulse 92   Ht 177.8 cm (70\")   Wt (!) 221 kg (487 lb 8 oz)   SpO2 95%   BMI 69.95 kg/m²   Estimated body mass index is 69.95 kg/m² as calculated from the following:    Height as of this encounter: 177.8 cm (70\").    Weight as of this encounter: 221 kg (487 lb 8 oz).      Physical Exam  Vitals and nursing note reviewed.   Constitutional:       General: He is not in acute distress.     " Appearance: Normal appearance. He is not ill-appearing.   HENT:      Head: Normocephalic and atraumatic.   Cardiovascular:      Rate and Rhythm: Normal rate and regular rhythm.      Pulses: Normal pulses.      Heart sounds: Normal heart sounds.   Pulmonary:      Effort: Pulmonary effort is normal. No respiratory distress.      Breath sounds: Normal breath sounds.   Musculoskeletal:      Right lower leg: No edema.      Left lower leg: No edema.   Skin:     General: Skin is warm and dry.   Neurological:      General: No focal deficit present.      Mental Status: He is alert and oriented to person, place, and time.      Motor: No weakness.      Coordination: Coordination normal.   Psychiatric:         Mood and Affect: Mood normal.         Behavior: Behavior normal.          Assessment and Plan     Assessment/Plan:  Diagnoses and all orders for this visit:    1. Primary hypertension  Assessment & Plan:  Hypertension is chronic and improving on current therapy.  I recommend that he continue to monitor levels, and we will reevaluate in 3 months.  He is in agreement with this plan.    Orders:  -     metoprolol tartrate (LOPRESSOR) 25 MG tablet; Take 0.5 tablets by mouth 2 (Two) Times a Day.  Dispense: 90 tablet; Refill: 0        Follow Up  Return in about 3 months (around 6/11/2024) for Annual Physical.    Flory Garcia PA-C  [facility] Internal Medicine [facility]

## 2024-03-11 NOTE — ASSESSMENT & PLAN NOTE
Hypertension is chronic and improving on current therapy.  I recommend that he continue to monitor levels, and we will reevaluate in 3 months.  He is in agreement with this plan.

## 2024-08-14 ENCOUNTER — OFFICE VISIT (OUTPATIENT)
Dept: FAMILY MEDICINE CLINIC | Facility: CLINIC | Age: 29
End: 2024-08-14
Payer: COMMERCIAL

## 2024-08-14 VITALS
BODY MASS INDEX: 42.66 KG/M2 | OXYGEN SATURATION: 97 % | WEIGHT: 315 LBS | HEART RATE: 96 BPM | DIASTOLIC BLOOD PRESSURE: 82 MMHG | HEIGHT: 72 IN | SYSTOLIC BLOOD PRESSURE: 110 MMHG

## 2024-08-14 DIAGNOSIS — R31.9 HEMATURIA, UNSPECIFIED TYPE: ICD-10-CM

## 2024-08-14 DIAGNOSIS — G47.33 OBSTRUCTIVE SLEEP APNEA SYNDROME: ICD-10-CM

## 2024-08-14 DIAGNOSIS — I10 PRIMARY HYPERTENSION: Primary | ICD-10-CM

## 2024-08-14 DIAGNOSIS — E66.01 CLASS 3 SEVERE OBESITY DUE TO EXCESS CALORIES WITH SERIOUS COMORBIDITY AND BODY MASS INDEX (BMI) OF 60.0 TO 69.9 IN ADULT: ICD-10-CM

## 2024-08-14 DIAGNOSIS — R30.0 DYSURIA: ICD-10-CM

## 2024-08-14 LAB
BILIRUB BLD-MCNC: NEGATIVE MG/DL
CLARITY, POC: CLEAR
COLOR UR: ABNORMAL
EXPIRATION DATE: ABNORMAL
GLUCOSE UR STRIP-MCNC: NEGATIVE MG/DL
KETONES UR QL: NEGATIVE
LEUKOCYTE EST, POC: NEGATIVE
Lab: ABNORMAL
NITRITE UR-MCNC: NEGATIVE MG/ML
PH UR: 6 [PH] (ref 5–8)
PROT UR STRIP-MCNC: NEGATIVE MG/DL
RBC # UR STRIP: ABNORMAL /UL
SP GR UR: 1.02 (ref 1–1.03)
UROBILINOGEN UR QL: ABNORMAL

## 2024-08-14 RX ORDER — GUANFACINE 1 MG/1
1 TABLET, EXTENDED RELEASE ORAL DAILY
COMMUNITY
Start: 2024-08-01

## 2024-08-14 NOTE — PROGRESS NOTES
Office Note     Name: Jordan Castillo    : 1995     MRN: [id number]     Chief Complaint  Hypertension (Checking after starting new meds ) and Urinary Tract Infection (Mild burning, cloudy urine X3 days)    Subjective     Jordan Castillo is a 29 y.o. male.  History of Present Illness  He is here for eval of hypertension. He has implemented dietary modifications and initiated guanfacine for his ADHD. He monitors his blood pressure sporadically, and his systolic blood pressure typically ranges between 110 and 140. He has his blood pressure checked at work, but he suspects that  his home monitor may be too small, potentially leading to inaccurate readings.    His ADHD symptoms have shown some improvement with Intuniv, as he now occasionally remembers where he has placed items. However, other symptoms persist. He is currently engaged in telehealth sessions for behavioral health, and they plan to discuss increasing the dose on his next visit.    He recently participated in a health study involving regular urine screens. His most recent screen showed cloudy urine, and he sometimes experiences a burning sensation during urination. The cloudiness of his urine has raised his concern. He urinates approximately 3 to 4 times daily and maintains high water intake. He occasionally experiences mild incontinence, which he believes may be related to his weight. He reports no discharge. As part of the study, he undergoes monthly urine tests for STDs. He has been substituting soda with seltzer, but still consumes 1 to 2 cans of Dr. Pepper daily.    He is making efforts to lose weight but is finding the process challenging.  He is interested in a referral to help with weight loss.    He had to cancel the sleep study as he was not able to afford it at that time.      Review of Systems:   Review of Systems   Constitutional:  Negative for chills.   Gastrointestinal:  Negative for nausea and vomiting.   Genitourinary:   Positive for dysuria and frequency. Negative for flank pain, hematuria and urgency.       Past Medical History:   Past Medical History:   Diagnosis Date    ADHD (attention deficit hyperactivity disorder) 7/30/24    Anxiety and depression     Hypertension 2/9/23    Obesity     Seasonal allergies     Tonsillitis, chronic        Past Surgical History:   Past Surgical History:   Procedure Laterality Date    TONSILLECTOMY N/A 08/09/2018    Procedure: TONSILLECTOMY;  Surgeon: Dallin Castellanos MD;  Location: [facility];  Service: ENT    TONSILLECTOMY  2020    WISDOM TOOTH EXTRACTION         Family History:   Family History   Problem Relation Age of Onset    Anxiety disorder Mother     Mental illness Brother     Diabetes Maternal Grandmother     Coronary artery disease Maternal Grandmother 40    Heart disease Maternal Grandmother        Social History:   Social History     Socioeconomic History    Marital status: Single   Tobacco Use    Smoking status: Every Day     Current packs/day: 1.00     Average packs/day: 1 pack/day for 3.0 years (3.0 ttl pk-yrs)     Types: Cigarettes     Passive exposure: Never    Smokeless tobacco: Never    Tobacco comments:     Vapor   Vaping Use    Vaping status: Every Day    Substances: Nicotine    Devices: Disposable   Substance and Sexual Activity    Alcohol use: Not Currently     Comment: Rarley    Drug use: Never    Sexual activity: Yes     Partners: Male     Birth control/protection: Partner of same sex       Immunizations:   Immunization History   Administered Date(s) Administered    31-influenza Vac Quardvalent Preservativ 10/01/2018    COVID-19 (MODERNA) 1st,2nd,3rd Dose Monovalent 01/13/2021, 02/10/2021    COVID-19 (MODERNA) Monovalent Original Booster 01/11/2022    DTaP, Unspecified 07/11/2001    Fluzone (or Fluarix & Flulaval for VFC) >6mos 11/25/2018    HEP A LIVE ATTENUATED 10/01/2018    Hepatitis A 11/25/2018    IPV 07/11/2001    Influenza Injectable Mdck Pf Quad 01/03/2020    MMR  "07/11/2001    Tdap 09/19/2007        Medications:     Current Outpatient Medications:     buPROPion XL (WELLBUTRIN XL) 150 MG 24 hr tablet, Take 1 tablet by mouth Daily. Take 2 tabs PO QAM and 1 tab QPM, Disp: , Rfl:     busPIRone (BUSPAR) 15 MG tablet, Take 2 tablets by mouth 2 (Two) Times a Day., Disp: , Rfl:     guanFACINE HCl ER (INTUNIV) 1 MG tablet sustained-release 24 hour tablet, Take 1 tablet by mouth Daily., Disp: , Rfl:     Allergies:   Allergies   Allergen Reactions    Penicillins Rash and Unknown (See Comments)     Penicillins       Objective     Vital Signs  /82 (BP Location: Left arm, Patient Position: Sitting, Cuff Size: Large Adult)   Pulse 96   Ht 182.9 cm (72\")   Wt (!) 220 kg (484 lb)   SpO2 97%   BMI 65.64 kg/m²   Estimated body mass index is 65.64 kg/m² as calculated from the following:    Height as of this encounter: 182.9 cm (72\").    Weight as of this encounter: 220 kg (484 lb).      Physical Exam  Vitals and nursing note reviewed.   Constitutional:       General: He is not in acute distress.     Appearance: Normal appearance. He is not ill-appearing.   HENT:      Head: Normocephalic and atraumatic.   Cardiovascular:      Rate and Rhythm: Normal rate and regular rhythm.      Pulses: Normal pulses.      Heart sounds: Normal heart sounds.   Pulmonary:      Effort: Pulmonary effort is normal. No respiratory distress.      Breath sounds: Normal breath sounds.   Musculoskeletal:      Right lower leg: No edema.      Left lower leg: No edema.   Skin:     General: Skin is warm and dry.   Neurological:      General: No focal deficit present.      Mental Status: He is alert and oriented to person, place, and time.      Motor: No weakness.      Coordination: Coordination normal.   Psychiatric:         Mood and Affect: Mood normal.         Behavior: Behavior normal.          Results:  Recent Results (from the past 24 hour(s))   POC Urinalysis Dipstick, Automated    Collection Time: 08/14/24  " 3:10 PM    Specimen: Urine   Result Value Ref Range    Color Dark Yellow Yellow, Straw, Dark Yellow, Lizabeth    Clarity, UA Clear Clear    Specific Gravity  1.025 1.005 - 1.030    pH, Urine 6.0 5.0 - 8.0    Leukocytes Negative Negative    Nitrite, UA Negative Negative    Protein, POC Negative Negative mg/dL    Glucose, UA Negative Negative mg/dL    Ketones, UA Negative Negative    Urobilinogen, UA 0.2 E.U./dL Normal, 0.2 E.U./dL    Bilirubin Negative Negative    Blood, UA Trace (A) Negative    Lot Number [id number]     Expiration Date 3,312,025         Assessment and Plan       Diagnoses and all orders for this visit:    1. Primary hypertension (Primary)  Assessment & Plan:  Blood pressure readings are within normal range, which may be due to the initiation of Intuniv for ADHD management.  Metoprolol will be discontinued from the medication regimen reminded with his newly added medication. He is advised to monitor blood pressure at work and ensure the cuff size is appropriate for accurate readings.  He expressed understanding and is in agreement with the plan      2. Dysuria  Comments:  UA was negative for infection.  Patient encouraged to increase water intake.  Discussed bladder irritants to avoid.  Orders:  -     POC Urinalysis Dipstick, Automated    3. Hematuria, unspecified type  Comments:  Urine does show a trace amount of blood.  Order placed to repeat UA in 1 month to ensure resolution.  Orders:  -     POC Urinalysis Dipstick, Automated; Future    4. Class 3 severe obesity due to excess calories with serious comorbidity and body mass index (BMI) of 60.0 to 69.9 in adult  Assessment & Plan:  Patient's (Body mass index is 65.64 kg/m².) indicates that they are morbidly/severely obese (BMI > 40 or > 35 with obesity - related health condition) with health conditions that include obstructive sleep apnea and hypertension . Weight is unchanged. BMI  is above average; BMI management plan is completed. We discussed portion  control, increasing exercise, and referral to weight management .     Orders:  -     Ambulatory Referral to Weight Management Program    5. Obstructive sleep apnea syndrome  Assessment & Plan:  He had to cancel the previously scheduled sleep study due to financial constraints. He is advised to call and schedule the sleep study independently as the referral has already been placed.  He is in agreement.          Follow Up  Return in about 6 months (around 2/14/2025) for Annual Physical and in about 1 month for repeat UA only.    Flory Garcia PA-C  [facility]        Patient or patient representative verbalized consent for the use of Ambient Listening during the visit with  Flory Garcia PA-C for chart documentation. 8/14/2024  15:12 EDT

## 2024-08-25 PROBLEM — E66.01 CLASS 3 SEVERE OBESITY DUE TO EXCESS CALORIES WITH SERIOUS COMORBIDITY AND BODY MASS INDEX (BMI) OF 60.0 TO 69.9 IN ADULT: Status: ACTIVE | Noted: 2024-08-25

## 2024-08-25 PROBLEM — E66.813 CLASS 3 SEVERE OBESITY DUE TO EXCESS CALORIES WITH SERIOUS COMORBIDITY AND BODY MASS INDEX (BMI) OF 60.0 TO 69.9 IN ADULT: Status: ACTIVE | Noted: 2024-08-25

## 2024-08-25 NOTE — ASSESSMENT & PLAN NOTE
Patient's (Body mass index is 65.64 kg/m².) indicates that they are morbidly/severely obese (BMI > 40 or > 35 with obesity - related health condition) with health conditions that include obstructive sleep apnea and hypertension . Weight is unchanged. BMI  is above average; BMI management plan is completed. We discussed portion control, increasing exercise, and referral to weight management .

## 2024-08-25 NOTE — ASSESSMENT & PLAN NOTE
Blood pressure readings are within normal range, which may be due to the initiation of Intuniv for ADHD management.  Metoprolol will be discontinued from the medication regimen reminded with his newly added medication. He is advised to monitor blood pressure at work and ensure the cuff size is appropriate for accurate readings.  He expressed understanding and is in agreement with the plan

## 2024-08-25 NOTE — ASSESSMENT & PLAN NOTE
He had to cancel the previously scheduled sleep study due to financial constraints. He is advised to call and schedule the sleep study independently as the referral has already been placed.  He is in agreement.

## 2024-11-25 ENCOUNTER — TELEPHONE (OUTPATIENT)
Dept: SLEEP MEDICINE | Age: 29
End: 2024-11-25
Payer: COMMERCIAL

## 2024-11-25 NOTE — TELEPHONE ENCOUNTER
I called for the APRN that ordered the HST at . I had to leave a message with someone who stated they would have the nurses call me. I was only calling for a copy of that HST.

## 2024-12-03 ENCOUNTER — OFFICE VISIT (OUTPATIENT)
Dept: SLEEP MEDICINE | Age: 29
End: 2024-12-03
Payer: COMMERCIAL

## 2024-12-03 VITALS
WEIGHT: 315 LBS | TEMPERATURE: 98.9 F | HEART RATE: 84 BPM | HEIGHT: 72 IN | OXYGEN SATURATION: 98 % | SYSTOLIC BLOOD PRESSURE: 110 MMHG | DIASTOLIC BLOOD PRESSURE: 80 MMHG | BODY MASS INDEX: 42.66 KG/M2

## 2024-12-03 DIAGNOSIS — G47.19 EXCESSIVE DAYTIME SLEEPINESS: ICD-10-CM

## 2024-12-03 DIAGNOSIS — E66.01 CLASS 3 SEVERE OBESITY DUE TO EXCESS CALORIES WITH SERIOUS COMORBIDITY AND BODY MASS INDEX (BMI) OF 60.0 TO 69.9 IN ADULT: Primary | ICD-10-CM

## 2024-12-03 DIAGNOSIS — R06.83 SNORING: ICD-10-CM

## 2024-12-03 DIAGNOSIS — G47.33 OBSTRUCTIVE SLEEP APNEA SYNDROME: ICD-10-CM

## 2024-12-03 DIAGNOSIS — E66.813 CLASS 3 SEVERE OBESITY DUE TO EXCESS CALORIES WITH SERIOUS COMORBIDITY AND BODY MASS INDEX (BMI) OF 60.0 TO 69.9 IN ADULT: Primary | ICD-10-CM

## 2024-12-03 PROCEDURE — 99203 OFFICE O/P NEW LOW 30 MIN: CPT | Performed by: INTERNAL MEDICINE

## 2024-12-03 NOTE — PROGRESS NOTES
"  Jordan Castillo is a 29 y.o. male.   Chief Complaint   Patient presents with    Unable To Stay Asleep    gasping for air    Snoring    Witnessed Apnea    Morning Headaches    Daytime Sleepiness    Dry Mouth       HPI     29 y.o. male seen in consultation at the request of Flory Garcia for evaluation of the above.     He describes a longstanding history of excessive daytime somnolence.  This has been present for over 5 years.  He was evaluated at  in 2022 and underwent a home sleep apnea test there on 5/12/2022 which revealed a moderate degree of obstructive sleep apnea with an AHI of 19.1.    He never obtained CPAP therapy due to insurance issues with losing a policy etc.    He now presents with ongoing symptoms of excessive daytime somnolence despite an average of 9 hours of sleep per night.  He typically does not nap during the day.  He falls asleep in around 30 minutes at night.  He has frequent nocturnal awakenings and often awakens with a headache.  He has a dry mouth in the morning.  He has been observed to snore and have apneas.    [place] Scale is: 1/24    The patient's relevant past medical, surgical, family, and social history reviewed and updated in Epic as appropriate.    Current medications are:   Current Outpatient Medications:     buPROPion XL (WELLBUTRIN XL) 150 MG 24 hr tablet, Take 1 tablet by mouth Daily. Take 2 tabs PO QAM and 1 tab QPM, Disp: , Rfl:     busPIRone (BUSPAR) 15 MG tablet, Take 2 tablets by mouth 2 (Two) Times a Day., Disp: , Rfl:     guanFACINE HCl ER (INTUNIV) 1 MG tablet sustained-release 24 hour tablet, Take 1 tablet by mouth Daily., Disp: , Rfl: .    Review of Systems    Review of Systems  ROS documented in patient questionnaire ×14 systems.  Reviewed with patient.  Otherwise negative except as noted in HPI.    Physical Exam    Blood pressure 110/80, pulse 84, temperature 98.9 °F (37.2 °C), temperature source Oral, height 182.9 cm (72\"), weight (!) 214 kg (471 " lb), SpO2 98%. Body mass index is 63.88 kg/m².    Physical Exam  Vitals and nursing note reviewed.   Constitutional:       Appearance: Normal appearance. He is well-developed.   HENT:      Head: Normocephalic and atraumatic.      Nose: Nose normal.      Mouth/Throat:      Mouth: Mucous membranes are moist.      Pharynx: Oropharynx is clear. No oropharyngeal exudate.      Comments: Class IV airway  Eyes:      General: No scleral icterus.     Conjunctiva/sclera: Conjunctivae normal.   Neck:      Thyroid: No thyromegaly.      Trachea: No tracheal deviation.   Cardiovascular:      Rate and Rhythm: Normal rate and regular rhythm.      Heart sounds: No murmur heard.     No friction rub. No gallop.   Pulmonary:      Effort: Pulmonary effort is normal. No respiratory distress.      Breath sounds: No wheezing or rales.   Musculoskeletal:         General: No deformity. Normal range of motion.   Skin:     General: Skin is warm and dry.      Findings: No rash.   Neurological:      Mental Status: He is alert and oriented to person, place, and time.   Psychiatric:         Behavior: Behavior normal.         Thought Content: Thought content normal.         DATA:    Reviewed HSAT dated 5/12/2022 as described above revealing moderate DAHLIA    Reviewed bed partner questionnaire    ASSESSMENT:    Problem List Items Addressed This Visit          Pulmonary Problems    Obstructive sleep apnea syndrome    Relevant Orders    PAP Therapy       Other    Class 3 severe obesity due to excess calories with serious comorbidity and body mass index (BMI) of 60.0 to 69.9 in adult - Primary     Other Visit Diagnoses       Snoring        Relevant Orders    PAP Therapy    Excessive daytime sleepiness        Relevant Orders    PAP Therapy            29-year-old male with multiple signs and symptoms of obstructive sleep apnea including snoring, witnessed apneas, daytime somnolence, morning headaches, dry mouth, and frequent nocturnal awakenings.  He has a  markedly elevated BMI and class IV airway put him at morphologic risk for obstructive sleep apnea.  In fact, this diagnosis was supported in May 2022 as described above with an HSAT that revealed an AHI of 19.1 consistent with moderate obstructive sleep apnea.  He never pursued treatment due to financial issues.  He is now ready to be treated and I believe this would be medically very appropriate.    Furthermore, I see no need for another diagnostic study as he never pursued treatment and has not changed clinically significantly since May 2022 when his study was done.  I have reviewed a copy of that which is in his chart.    PLAN:    I have discussed all the above with the patient and he is agreeable and eager to proceed with treatment.  I have discussed options with him.  Auto CPAP 8-20 cm H2O.  I have rendered these orders.  We discussed mask options and mask fitting  Long-term weight reduction would be beneficial as it pertains to his obstructive sleep apnea and health in general  Follow-up in the sleep center between 31 and 90 days of initiation of therapy to assess efficacy and compliance of therapy.  If he has any problems prior to that he will call us    I have reviewed the results of my evaluation and impression and discussed my recommendations in detail with the patient.    Signed by  Kevin Victor MD    December 3, 2024      CC: Flory Garcia PA-C [place], Brittany Gil*

## 2025-02-20 NOTE — PROGRESS NOTES
Sleep Clinic Follow Up Note    Chief Complaint  Sleeping Problem, Sleep Apnea, and Follow-up    Subjective     History of Present Illness (from previous encounter on 12/3/2024 by Dr. Victor):  He describes a longstanding history of excessive daytime somnolence.  This has been present for over 5 years.  He was evaluated at  in 2022 and underwent a home sleep apnea test there on 5/12/2022 which revealed a moderate degree of obstructive sleep apnea with an AHI of 19.1.     He never obtained CPAP therapy due to insurance issues with losing a policy etc.     He now presents with ongoing symptoms of excessive daytime somnolence despite an average of 9 hours of sleep per night.  He typically does not nap during the day.  He falls asleep in around 30 minutes at night.  He has frequent nocturnal awakenings and often awakens with a headache.  He has a dry mouth in the morning.  He has been observed to snore and have apneas.     [place] Scale is: 1/24 (End copied text).    Interval History:  Jordan Castillo is a 30 y.o. male returns for follow up and compliance of PAP therapy. The patient was last seen on 12/3/2024 with Dr. Victor. Overall the patient feels poor with regard to therapy. He did well initially with the device but the next month not as well the second month with the mask on the floor. He occasionally feels as though he is suffocating. The device appears to be working appropriately. On average the patient sleeps 6 hours per night. The patient wakes 1 times per night.     The patient reports the following changes to their medical and medication history since they were last seen:  Started Propranolol    Further details are as follows:      [place] Scale is (out of 24): Total score: 8     Weight:  Current Weight: 483 lb    Weight change in the last year:  gain: 0 lbs    The patient's relevant past medical, surgical, family, and social history reviewed and updated in Epic as appropriate.    PMH:    Past  "Medical History:   Diagnosis Date    ADHD (attention deficit hyperactivity disorder) 7/30/24    Anxiety and depression     Hypertension 2/9/23    Obesity     Seasonal allergies     Tonsillitis, chronic      Past Surgical History:   Procedure Laterality Date    TONSILLECTOMY N/A 08/09/2018    Procedure: TONSILLECTOMY;  Surgeon: Dallin Castellanos MD;  Location: [facility];  Service: ENT    TONSILLECTOMY  2020    WISDOM TOOTH EXTRACTION         Allergies   Allergen Reactions    Penicillins Rash and Unknown (See Comments)     Penicillins       MEDS:  Prior to Admission medications    Medication Sig Start Date End Date Taking? Authorizing Provider   buPROPion XL (WELLBUTRIN XL) 150 MG 24 hr tablet Take 1 tablet by mouth Daily. Take 2 tabs PO QAM and 1 tab QPM    Holly Rios MD   busPIRone (BUSPAR) 15 MG tablet Take 2 tablets by mouth 2 (Two) Times a Day.    Holly Rios MD   guanFACINE HCl ER (INTUNIV) 1 MG tablet sustained-release 24 hour tablet Take 1 tablet by mouth Daily. 8/1/24   ProviderHolly MD         FH:  Family History   Problem Relation Age of Onset    Anxiety disorder Mother     Mental illness Brother     Diabetes Maternal Grandmother     Coronary artery disease Maternal Grandmother 40    Heart disease Maternal Grandmother     COPD Maternal Grandmother     Cancer Maternal Grandmother     Cancer Paternal Grandmother     Cancer Paternal Grandfather        Objective   Vital Signs:  /68 (BP Location: Left arm, Patient Position: Sitting, Cuff Size: Adult)   Pulse 84   Temp 97.3 °F (36.3 °C) (Temporal)   Ht 182.9 cm (72.01\")   Wt (!) 219 kg (483 lb 3.2 oz)   SpO2 96%   BMI 65.52 kg/m²     Patient's (Body mass index is 65.52 kg/m².) indicates that they are morbidly obese (BMI > 40 or > 35 with obesity - related health condition) with health related conditions that include obstructive sleep apnea . Weight is unchanged.               Physical Exam  Vitals reviewed. "   Constitutional:       Appearance: Normal appearance.   HENT:      Head: Normocephalic and atraumatic.      Nose: Nose normal.      Mouth/Throat:      Mouth: Mucous membranes are moist.   Cardiovascular:      Rate and Rhythm: Normal rate and regular rhythm.      Heart sounds: No murmur heard.     No friction rub. No gallop.   Pulmonary:      Effort: Pulmonary effort is normal. No respiratory distress.      Breath sounds: Normal breath sounds. No wheezing or rhonchi.   Neurological:      Mental Status: He is alert and oriented to person, place, and time.   Psychiatric:         Behavior: Behavior normal.               Result Review :           PAP Report:  AHI: 0.2/h  Days of Usage: 26/30 (87%)  Number of Days Greater than 4 hours: 9/30 (30%)  Average time (days used): 3 hours 10-minute  95th Percentile Pressure: 13.1 cmH2O  95th percentile leaks: 7.1 L/min  Settings: Auto CPAP-8/20 cm H2O, response standard.       Assessment and Plan  Jordan Castillo is a 30 y.o. male who returns for follow-up and compliance of PAP therapy.  The Pap report has been reviewed.  Overall usage is 87% with compliance at 30%.  The patient averages 3 hours and 10 minutes of therapy.  Sleep apnea is controlled with an AHI of 0.2/h.  Patient has noted that the first month he did better than second month with use of his device.  He wakes up with the mask on the floor.  Patient is already made an appointment with [facility] to try different mask fitting.  For now, I will refill the patient's supplies, and I have asked him to return for follow-up in approximately 3 months for recheck.  At that time we may consider prescription of Zepbound given his history of moderate sleep apnea and obesity.    Diagnoses and all orders for this visit:    1. Obstructive sleep apnea syndrome (Primary)  -     PAP Therapy    2. Morbid (severe) obesity due to excess calories               The patient continues to use and benefit from PAP therapy.    1. The  patient was counseled regarding multimodal approach with healthy nutrition, healthy sleep, regular physical activity, social activities, counseling, and medications. Encouraged to practice lateral sleep position. Avoid alcohol and sedatives close to bedtime.     2.  We will refill supplies x1 year.  Return to clinic 1 year or sooner if symptoms warrant. I have reviewed the results of my evaluation and impression and discussed my recommendations in detail with the patient.           Follow Up  Return in about 3 months (around 5/25/2025) for Recheck.  Patient was given instructions and counseling regarding his condition or for health maintenance advice. Please see specific information pulled into the AVS if appropriate.       JIMENA Moody, ACNP-BC  Pulmonology, Critical Care, and Sleep Medicine

## 2025-02-25 ENCOUNTER — OFFICE VISIT (OUTPATIENT)
Dept: SLEEP MEDICINE | Age: 30
End: 2025-02-25
Payer: COMMERCIAL

## 2025-02-25 VITALS
DIASTOLIC BLOOD PRESSURE: 68 MMHG | WEIGHT: 315 LBS | HEART RATE: 84 BPM | BODY MASS INDEX: 42.66 KG/M2 | TEMPERATURE: 97.3 F | OXYGEN SATURATION: 96 % | SYSTOLIC BLOOD PRESSURE: 122 MMHG | HEIGHT: 72 IN

## 2025-02-25 DIAGNOSIS — G47.33 OBSTRUCTIVE SLEEP APNEA SYNDROME: Primary | ICD-10-CM

## 2025-02-25 DIAGNOSIS — E66.01 MORBID (SEVERE) OBESITY DUE TO EXCESS CALORIES: ICD-10-CM

## 2025-02-25 RX ORDER — PROPRANOLOL HYDROCHLORIDE 10 MG/1
10 TABLET ORAL 3 TIMES DAILY
COMMUNITY

## 2025-04-21 NOTE — PROGRESS NOTES
Sleep Clinic Video Visit Follow Up Note    The patient is located at their home address in [place], [place]. The patient presents today for telehealth service.  This service was conducted via audio/video technology through a secure [employer] video visit connection through Epic.  This provider is located in [facility].  Patient stated they are in a secure environment for the session.  Patient's condition being diagnosed/treated is appropriate for telemedicine.  The provider identified himself as well as his credentials.  The patient, and/or patient's guardian, consent to be seen remotely, and when consent is given they understanding that the consent allows for patient identifiable information to be sent to a third-party as needed.  They may refuse to be seen remotely at any time.  The electronic data is encrypted and password protected, and the patient and/or guardian has been advised of the potential risk to privacy not withstanding such measures.  Patient identifiers used: Name and date of birth.     You have chosen to receive care through a telehealth visit.  Do you consent to use a video connection for your medical care today? Yes     Mode of Visit: Video  Location of patient: -HOME-  Location of provider: +[facility]  You have chosen to receive care through a telehealth visit.  The patient has signed the video visit consent form.  The visit included audio and video interaction. No technical issues occurred during this visit.      Chief Complaint  Follow-up and compliance of PAP therapy    Subjective     History of Present Illness (from previous encounter on 2/25/2025):  Jordan Castillo is a 30 y.o. male who returns for follow-up and compliance of PAP therapy.  The Pap report has been reviewed.  Overall usage is 87% with compliance at 30%.  The patient averages 3 hours and 10 minutes of therapy.  Sleep apnea is controlled with an AHI of 0.2/h.  Patient has noted that the first month he did better than  second month with use of his device.  He wakes up with the mask on the floor.  Patient has already made an appointment with [employer] to try different mask fitting.  For now, I will refill the patient's supplies, and I have asked him to return for follow-up in approximately 3 months for recheck.  At that time we may consider prescription of Zepbound given his history of moderate sleep apnea and obesity. (End copied text)    Interval History:  Jordan Castillo is a 30 y.o. male returns for follow up and compliance of PAP therapy. The patient was last seen on 2/25/2025 by me. Overall the patient feels poor with regard to therapy. He continues to take the mask off in his sleep. He feels that he can't get enough air. He wants to try a different mask but can't afford it right now. The device appears to be working appropriately. On average the patient sleeps 8 hours per night. The patient wake 3 times per night.     The patient reports the following changes to their medical and medication history since they were last seen:  None    Further details are as follows:    Berino Scale is: 12/24      Weight:  Current Weight: 483 lb    Weight change in the last year:  gain: 0 lbs    The patient's relevant past medical, surgical, family, and social history reviewed and updated in Epic as appropriate.    PMH:    Past Medical History:   Diagnosis Date    ADHD (attention deficit hyperactivity disorder) 7/30/24    Anxiety and depression     Hypertension 2/9/23    Obesity     Seasonal allergies     Tonsillitis, chronic      Past Surgical History:   Procedure Laterality Date    TONSILLECTOMY N/A 08/09/2018    Procedure: TONSILLECTOMY;  Surgeon: Dallin Castellanos MD;  [facility]: [facility];  Service: ENT    TONSILLECTOMY  2020    WISDOM TOOTH EXTRACTION         Allergies   Allergen Reactions    Penicillins Rash and Unknown (See Comments)     Penicillins       MEDS:  Prior to Admission medications    Medication Sig Start Date End Date  "Taking? Authorizing Provider   buPROPion XL (WELLBUTRIN XL) 150 MG 24 hr tablet Take 1 tablet by mouth Daily. Take 2 tabs PO QAM and 1 tab QPM    Holly Rios MD   busPIRone (BUSPAR) 15 MG tablet Take 2 tablets by mouth 2 (Two) Times a Day.    Holly Rios MD   guanFACINE HCl ER (INTUNIV) 1 MG tablet sustained-release 24 hour tablet Take 1 tablet by mouth Daily. 8/1/24   Holly Rios MD   propranolol (INDERAL) 10 MG tablet Take 1 tablet by mouth 3 (Three) Times a Day.    Holly Rios MD         FH:  Family History   Problem Relation Age of Onset    Anxiety disorder Mother     Mental illness Brother     Diabetes Maternal Grandmother     Coronary artery disease Maternal Grandmother 40    Heart disease Maternal Grandmother     COPD Maternal Grandmother     Cancer Maternal Grandmother     Cancer Paternal Grandmother     Cancer Paternal Grandfather        Objective   Vital Signs:  Ht 182.9 cm (72.01\")   Wt (!) 219 kg (483 lb)   BMI 65.49 kg/m²     Patient's (Body mass index is 65.49 kg/m².) indicates that they are morbidly obese (BMI > 40 or > 35 with obesity - related health condition) with health related conditions that include obstructive sleep apnea . Weight is unchanged. BMI is is above average; BMI management plan is completed. We discussed portion control and increasing exercise.            Physical Exam  Constitutional:       Appearance: Normal appearance.   Neurological:      Mental Status: He is alert and oriented to person, place, and time.   Psychiatric:         Mood and Affect: Mood normal.         Behavior: Behavior normal.         Thought Content: Thought content normal.         Judgment: Judgment normal.               Result Review :           PAP Report:  AHI: 0.3/h  Days of Usage: 26/30 (87%)  Number of Days Greater than 4 hours: 1/30 (3%)  Average time (days used): 2 hours 39 minutes  95th Percentile Pressure: 10.3 cmH2O  95th percentile leaks: 20.3 " L/min  Settings: Auto CPAP-8/20 cm H2O, EPR off, response standard.       Assessment and Plan  Jordan Castillo is a 30 y.o. male who returns for follow-up and compliance of PAP therapy.  The pap report has been reviewed.  Overall usage is 87% with compliance at 3%.  The patient averaged 2 hours and 39 minutes of therapy.  Sleep apnea is well-controlled with an AHI of 0.3/h.  Patient continues to have difficulty with his mask and feeling as though he is not getting enough air.  He has been unable to obtain a new mask due to cost.  He is going to try to find a mask with a local [employer] company that might be cheaper.  I have noted that at this point, if the mask is not helpful, then we we will need to schedule a titration study.  Patient may need BiPAP as opposed to auto CPAP.  We also discussed prescription of Zepbound.  I am going to refer him to weight management/bariatrics for further evaluation and treatment.  We will see him in about 6 months for follow-up and recheck.  He is going to reach out and let me know in between time should the mask continue to fail at which time I will order a titration study.    I will refill the patient's supplies.      Diagnoses and all orders for this visit:    1. Obstructive sleep apnea syndrome (Primary)  -     PAP Therapy  -     Ambulatory Referral to Weight Management Program    2. Morbid (severe) obesity due to excess calories  -     Ambulatory Referral to Weight Management Program           The patient continues to use and benefit from PAP therapy.    1. The patient was counseled regarding multimodal approach with healthy nutrition, healthy sleep, regular physical activity, social activities, counseling, and medications. Encouraged to practice lateral sleep position. Avoid alcohol and sedatives close to bedtime.     2.  We will refill supplies x1 year.  Return to clinic 1 year or sooner if symptoms warrant.  Patient gave verbal consent today for video visit. I have reviewed the  results of my evaluation and impression and discussed my recommendations in detail with the patient.         Follow Up  Return in about 6 months (around 10/24/2025).  Patient was given instructions and counseling regarding his condition or for health maintenance advice. Please see specific information pulled into the AVS if appropriate.       JIMENA Moody, ACNP-BC  Pulmonology, Critical Care, and Sleep Medicine

## 2025-04-24 ENCOUNTER — TELEMEDICINE (OUTPATIENT)
Dept: SLEEP MEDICINE | Age: 30
End: 2025-04-24
Payer: COMMERCIAL

## 2025-04-24 VITALS — WEIGHT: 315 LBS | BODY MASS INDEX: 42.66 KG/M2 | HEIGHT: 72 IN

## 2025-04-24 DIAGNOSIS — G47.33 OBSTRUCTIVE SLEEP APNEA SYNDROME: Primary | ICD-10-CM

## 2025-04-24 DIAGNOSIS — E66.01 MORBID (SEVERE) OBESITY DUE TO EXCESS CALORIES: ICD-10-CM

## (undated) DEVICE — PK MAJ ENT 10

## (undated) DEVICE — COVER,LIGHT HANDLE,FLX,1/PK: Brand: MEDLINE INDUSTRIES, INC.

## (undated) DEVICE — FLTR HME STR UNIV W/SMPL PORT

## (undated) DEVICE — INTENDED FOR TISSUE SEPARATION, AND OTHER PROCEDURES THAT REQUIRE A SHARP SURGICAL BLADE TO PUNCTURE OR CUT.: Brand: BARD-PARKER ® STAINLESS STEEL BLADES

## (undated) DEVICE — AIRWY SZ11

## (undated) DEVICE — CANNULA,OXY,ADULT,SUPERSOFT,W/7'TUB,UC: Brand: MEDLINE

## (undated) DEVICE — PAD GRND REM POLYHESIVE A/ DISP

## (undated) DEVICE — MEDI-VAC YANKAUER SUCTION HANDLE W/BULBOUS TIP: Brand: CARDINAL HEALTH

## (undated) DEVICE — MEDI-VAC NON-CONDUCTIVE SUCTION TUBING: Brand: CARDINAL HEALTH

## (undated) DEVICE — GLV SURG SIGNATURE TOUCH PF LTX 7 STRL

## (undated) DEVICE — TONSIL SPONGES: Brand: DEROYAL

## (undated) DEVICE — ELECTROSURGICAL SUCTION COAGULATOR 10FR

## (undated) DEVICE — NEURO SPONGES: Brand: DEROYAL